# Patient Record
Sex: MALE | Race: WHITE | Employment: OTHER
[De-identification: names, ages, dates, MRNs, and addresses within clinical notes are randomized per-mention and may not be internally consistent; named-entity substitution may affect disease eponyms.]

---

## 2017-07-14 ENCOUNTER — TELEPHONE (OUTPATIENT)
Dept: CASE MANAGEMENT | Age: 67
End: 2017-07-14

## 2017-08-16 ENCOUNTER — TELEPHONE (OUTPATIENT)
Dept: CASE MANAGEMENT | Age: 67
End: 2017-08-16

## 2017-08-18 ENCOUNTER — TELEPHONE (OUTPATIENT)
Dept: CASE MANAGEMENT | Age: 67
End: 2017-08-18

## 2018-08-03 ENCOUNTER — TELEPHONE (OUTPATIENT)
Dept: CASE MANAGEMENT | Age: 68
End: 2018-08-03

## 2018-08-03 NOTE — LETTER
Lung Screening Program      8/3/2018      Jenny Bloom  [address]      Dear Jenny Maincece,        Our records indicate that based on your lung screen performed at [facility] ( [facility]), it is time to schedule your yearly lung screen. National guidelines for preventive care encourage patients who smoke, or who have a history of smoking, to receive yearly lung screens if you:    ? are between the ages of 50-69  ? have smoked a pack a day or more for 30 years (or its equivalent)  ? continue to smoke or have quit within the past 15 year    To schedule a lung screen you first need to have a discussion with your primary care physician and obtain an order. Screenings are now billed to your insurance, call our patient navigator for more information at [phone]. To schedule the screen call [phone] at your earliest convenience. If you had scans that were done elsewhere,  please call to inform me; we value you as a patient and want to ensure that you are getting the appropriate care. If you have any questions or need assistance in scheduling, please dont hesitate to call.            [address]:  [phone]  F:  [phone]        CC: Heath Godoy, [address] [place] [address]      8/3/2018          Heath Godoy MD  [address] [place] [address]  FAX:  [phone]      Dear Heath Godoy MD,    Our records indicate your patient, Jenny Bloom (1950) is due for his annual CT Lung Screening if he still meets screening criteria:    - Age 50-69 years  - 30 pack year smoking history  - Current smoker or former smoker who has quit within the last 15 years  - Asymptomatic of Lung Cancer    Thank you for your commitment to your patients and our CT Lung Screening Program.   If you have any additional questions or concerns regarding our CT Lung Screening Program or our Incidental Lung Nodule Program, please don't hesitate to call me at [phone]. [address]:  [phone]  F:  [phone]                                           CT Lung Screening/ Lung Nodule Program  [address] [facility]. [address]       TO:   Lorel Landau, [place]:   [phone]        FROM:   Troy Singleton, [place] Lung Screening Program    DATE:   8/3/2018    PHONE:  [phone]    FAX:    [phone]      Comments:  Annual CT Lung Screening Reminder     RE:  Radha Nichols  1950    Thank you for your referral to our [facility]le[facility] Gregg.  If you have any questions please call [phone] or fax [phone]. The information contained in this fax message is intended only for Personal and Confidential use of the designated recipient(s) names above. This information is to be handled as Privileged and Confidential. If the reader of this message is not the intended recipient, you are hereby notified that you have received this document in error, and that any review, dissemination, distribution, or copying of this message is strictly prohibited. If you receive this communication in error, please notify us immediately at the above number and return the original message to us by mail. Thank you. Member of [facility]. PHYSICIAN ORDER FORM   CT LUNG CANCER SCREENING       Scheduling Department Hours:  Monday - Friday 7:30 a.m. - 5:00 p.m.   Phone:  [phone]  Fax:  [phone]  8/3/2018       HT:___  WT:____    Patients Name: Radha Nichols    YOB: 1950    Address: [address] [place] [address],[address] [id number]     Home Phone: [phone] (home)    Mobile or Work Phone: ___________________ Ordering Physician: Anastacia Hurst MD  Office Number: [phone]  LQY:[phone]    Exam/Test Requested:     [x]  CT Lung Screen ()                                                                 Diagnosis:           [x]  Lung Cancer Screening               High Risk Patient History:   []  Current Smoker (ICD 10- F17.210)                                                                                       []  Former Smoker (ICD 10- F17.211)  Quit Date: ______                       Packs/day: _________ x Yrs. smoked: _________ = Pack Yrs.____________ ( 30+)    ? This patient has participated in a shared decision making session during which potential risks and benefits of CT Lung Screening were discussed. ? This patient was informed of the importance of adherence to annual screening, impact of comorbidities, and ability/willingness to undergo diagnosis and treatment. ? This patient was informed of the importance of smoking cessation and/or maintaining smoking abstinence. ? If applicable, this patient was offered information on smoking cessation counseling services. ? This patient is asymptomatic ( no symptoms such as fever, chest pain, new shortness of breath, new or changing cough, coughing up blood or unexplained significant weight loss).       Physician Signature: _____________________________NPI:  ____________________

## 2018-09-18 ENCOUNTER — TELEPHONE (OUTPATIENT)
Dept: CASE MANAGEMENT | Age: 68
End: 2018-09-18

## 2018-09-18 VITALS — HEIGHT: 70 IN | WEIGHT: 152 LBS | BODY MASS INDEX: 21.76 KG/M2

## 2018-09-19 ENCOUNTER — HOSPITAL ENCOUNTER (OUTPATIENT)
Dept: CT IMAGING | Age: 68
Discharge: HOME | End: 2018-09-21
Payer: MEDICARE

## 2018-09-19 DIAGNOSIS — D38.1 NEOPLASM OF UNCERTAIN BEHAVIOR OF TRACHEA, BRONCHUS, AND LUNG: ICD-10-CM

## 2018-09-19 DIAGNOSIS — F17.210 CIGARETTE SMOKER: ICD-10-CM

## 2018-09-19 PROCEDURE — G0297 LDCT FOR LUNG CA SCREEN: HCPCS

## 2018-09-21 ENCOUNTER — TELEPHONE (OUTPATIENT)
Dept: CASE MANAGEMENT | Age: 68
End: 2018-09-21

## 2019-02-22 ENCOUNTER — TELEPHONE (OUTPATIENT)
Dept: CASE MANAGEMENT | Age: 69
End: 2019-02-22

## 2019-03-15 ENCOUNTER — TELEPHONE (OUTPATIENT)
Dept: CASE MANAGEMENT | Age: 69
End: 2019-03-15

## 2019-04-08 ENCOUNTER — TELEPHONE (OUTPATIENT)
Dept: CASE MANAGEMENT | Age: 69
End: 2019-04-08

## 2019-04-08 NOTE — TELEPHONE ENCOUNTER
No call, encounter opened to process CT Lung Screening.       CT Lung Screen 9/19/18 :  Impression       1. Scattered bilateral noncalcified pulmonary nodules are solid/part   solid, measure up to 5 mm in size, and are mostly new from the prior   exam.       2. Mild centrilobular emphysema.       Lung - RADS Category 3 :  Probably benign finding (s) - short term   follow - up suggested, includes nodules with a low likelihood of   becoming a clinically active cancer - Follow-up CT scan in 6 months.                 Pack years: 51             History   Smoking Status    Current Every Day Smoker    Packs/day: 1.00    Years: 51.00   Smokeless Tobacco    Not on file               2/22/2019 Reminder mailed to patient.        3/15/2019 - Reminder mailed to patient and faxed to ordering physician.     4/8/2019 - Attempted follow up call, however \"voicemail has not be set up\". Reminder mailed to patients home address.     GRETA Driver., R.T.(R)(T)  Lung Nodule Navigator  CT Lung Cancer Screening- [facility]

## 2019-04-23 ENCOUNTER — TELEPHONE (OUTPATIENT)
Dept: CASE MANAGEMENT | Age: 69
End: 2019-04-23

## 2019-04-23 NOTE — LETTER
CT Lung Screening/ Lung Nodule Program  [address] [facility]. [place], [address]       TO:   Krzysztof Rosado MD    FAX:   [phone]        FROM:   [facility]) Lung Nodule Center/ CT Lung Screening Program    DATE:   4/23/2019    PHONE:  [phone]    FAX:    [phone]      Comments: RE:  Juwan Thomas  1950     Reminder:  Follow up may be due at this time. The information contained in this fax message is intended only for Personal and Confidential use of the designated recipient(s) names above. This information is to be handled as Privileged and Confidential. If the reader of this message is not the intended recipient, you are hereby notified that you have received this document in error, and that any review, dissemination, distribution, or copying of this message is strictly prohibited. If you receive this communication in error, please notify us immediately at the above number and return the original message to us by mail. Thank you. Member of [facility].

## 2019-05-02 ENCOUNTER — HOSPITAL ENCOUNTER (OUTPATIENT)
Dept: CT IMAGING | Age: 69
Discharge: HOME | End: 2019-05-04
Payer: MEDICARE

## 2019-05-02 DIAGNOSIS — D38.1 NEOPLASM OF UNCERTAIN BEHAVIOR OF TRACHEA, BRONCHUS, AND LUNG: ICD-10-CM

## 2019-05-02 PROCEDURE — 71250 CT THORAX DX C-: CPT

## 2019-05-28 ENCOUNTER — TELEPHONE (OUTPATIENT)
Dept: CASE MANAGEMENT | Age: 69
End: 2019-05-28

## 2019-05-28 NOTE — TELEPHONE ENCOUNTER
No call, encounter opened to process CT Lung Screening.       CT Lung Screen 9/19/18 :  Impression       1. Scattered bilateral noncalcified pulmonary nodules are solid/part   solid, measure up to 5 mm in size, and are mostly new from the prior   exam.       2. Mild centrilobular emphysema.       Lung - RADS Category 3 :  Probably benign finding (s) - short term   follow - up suggested, includes nodules with a low likelihood of   becoming a clinically active cancer - Follow-up CT scan in 6 months.                 Pack years: 51             History   Smoking Status    Current Every Day Smoker    Packs/day: 1.00    Years: 51.00   Smokeless Tobacco    Not on file               2/22/2019 Reminder mailed to patient.        3/15/2019 - Reminder mailed to patient and faxed to ordering physician.     4/8/2019 - Attempted follow up call, however \"voicemail has not be set up\".  Reminder mailed to patients home address.     4/23/2019 - Reminder mailed to patient and faxed to [facility], MD .       5/2/19 CT Chest:  Impression   Innumerable pulmonary nodules and groundglass opacities have all   resolved.  No further CT follow-up necessary.             GRETA Perez., R.T.(R)(T)  Lung Nodule Navigator  CT Lung Cancer Screening- [facility]

## 2022-04-25 ENCOUNTER — HOSPITAL ENCOUNTER (OUTPATIENT)
Dept: CT IMAGING | Age: 72
Discharge: HOME | End: 2022-04-27
Payer: MEDICARE

## 2022-04-25 DIAGNOSIS — F17.210 CIGARETTE SMOKER: ICD-10-CM

## 2022-04-25 PROCEDURE — 71271 CT THORAX LUNG CANCER SCR C-: CPT

## 2022-06-16 ENCOUNTER — TELEPHONE (OUTPATIENT)
Dept: CASE MANAGEMENT | Age: 72
End: 2022-06-16

## 2022-06-16 NOTE — TELEPHONE ENCOUNTER
No call, encounter opened to process CT Lung Screening. CT Lung Screen: 4/25/2022    Impression   1. There is no pulmonary infiltrate, mass or suspicious pulmonary nodule. 2. Mild emphysematous changes. 3. Cholelithiasis.       LUNG RADS:   Per ACR Lung-RADS Version 1.1       Category 1, Negative (No nodules and definitely benign nodules).       Management: Continue annual lung screening with LDCT in 12 months.       RECOMMENDATIONS:   If you would like to register your patient with the [facility], please contact the Nurse Navigator at   [phone]. Pack years: 46    Social History     Tobacco Use  Smoking Status: Current Every Day Smoker    Start Date:    Quit Date:    Types: Cigarettes   Packs/Day: 1   Years: 46   Pack Years: 46   Smokeless Tobacco:        Results letter sent to patient via my chart or mailed.      [facility]